# Patient Record
Sex: MALE | Race: WHITE
[De-identification: names, ages, dates, MRNs, and addresses within clinical notes are randomized per-mention and may not be internally consistent; named-entity substitution may affect disease eponyms.]

---

## 2017-12-16 ENCOUNTER — HOSPITAL ENCOUNTER (EMERGENCY)
Dept: HOSPITAL 72 - EMR | Age: 37
Discharge: HOME | End: 2017-12-16
Payer: COMMERCIAL

## 2017-12-16 VITALS — WEIGHT: 247 LBS | HEIGHT: 74 IN | BODY MASS INDEX: 31.7 KG/M2

## 2017-12-16 VITALS — SYSTOLIC BLOOD PRESSURE: 116 MMHG | DIASTOLIC BLOOD PRESSURE: 69 MMHG

## 2017-12-16 DIAGNOSIS — S05.01XA: Primary | ICD-10-CM
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
## 2017-12-16 DIAGNOSIS — I10: ICD-10-CM
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
## 2017-12-16 PROCEDURE — 99284 EMERGENCY DEPT VISIT MOD MDM: CPT

## 2017-12-16 NOTE — EMERGENCY ROOM REPORT
History of Present Illness


General


Chief Complaint:  Eye Problems


Source:  Patient





Present Illness


HPI


Is a 37-year-old male with no significant past medical history.  He presents 

with right eye pain.  Woke up with it.  [place] something in his eyes been rubbing 

it.  Now is getting worse.  No fever or chills.  Carrying.  Worse with light.  

Pain is 10 out of 10.  No other trauma.  Does not wear glasses.


Allergies:  


Coded Allergies:  


     No Known Allergies (Unverified , 12/16/17)





Patient History


Past Medical History:  see triage record, old chart reviewed


Past Surgical History:  other


Pertinent Family History:  none


Social History:  Denies: smoking


Immunizations:  other


Reviewed Nursing Documentation:  PMH: Agreed, PSxH: Agreed





Nursing Documentation-PMH


Hx Hypertension:  Yes





Review of Systems


Eye:  Reports: eye pain, Denies: blurred vision


ENT:  Denies: ear pain, nose congestion, throat swelling


Respiratory:  Denies: cough, shortness of breath


Cardiovascular:  Denies: chest pain, palpitations


Gastrointestinal:  Denies: abdominal pain, diarrhea, nausea, vomiting


Musculoskeletal:  Denies: back pain, joint pain


Skin:  Denies: rash


Neurological:  Denies: headache, numbness


Endocrine:  Denies: increased thirst, increased urine


Hematologic/Lymphatic:  Denies: easy bruising


All Other Systems:  negative except mentioned in HPI





Physical Exam





Vital Signs








  Date Time  Temp Pulse Resp B/P (MAP) Pulse Ox O2 Delivery O2 Flow Rate FiO2


 


12/16/17 02:20 97.9 108 16 116/69 95 Room Air  





vitals normal


Sp02 EP Interpretation:  reviewed, normal


General Appearance:  well appearing, no apparent distress, alert


Head:  normocephalic, atraumatic


Eyes:  left eye other - Right conjunctiva is injected.  There is a small 

corneal abrasion at 9:00 position.  Negative [place] sign., bilateral eye PERRL, 

bilateral eye EOMI


ENT:  hearing grossly normal, normal pharynx


Neck:  full range of motion, supple, no meningismus


Respiratory:  chest non-tender, lungs clear, normal breath sounds


Cardiovascular #1:  regular rate, rhythm, no murmur


Gastrointestinal:  normal bowel sounds, non tender, no mass, no organomegaly, 

no bruit, non-distended


Musculoskeletal:  back normal, gait/station normal, normal range of motion


Psychiatric:  mood/affect normal


Skin:  warm/dry





Medical Decision Making


Diagnostic Impression:  


 Primary Impression:  


 Corneal abrasion, right


 Qualified Codes:  S05.01XA - Injury of conjunctiva and corneal abrasion 

without foreign body, right eye, initial encounter


ER Course


Patient with a corneal abrasion.  No foreign body seen.  I everted the eyelids.

  No globe rupture.  We'll discharge home





Last Vital Signs








  Date Time  Temp Pulse Resp B/P (MAP) Pulse Ox O2 Delivery O2 Flow Rate FiO2


 


12/16/17 02:30 97.9  16 116/69 95 Room Air  


 


12/16/17 02:20  108      








Status:  improved


Disposition:  HOME, SELF-CARE


Condition:  Stable


Scripts


Ibuprofen* (MOTRIN*) 600 Mg Tablet


600 MG ORAL THREE TIMES A DAY, #30 TAB 0 Refills


   Prov: NALLELY BARRIOS M.D.         12/16/17 


Polymyxin/Trimethoprim (Polytrim Eye Drops) 10 Ml Drops


2 DROP OPHTHALM THREE TIMES A DAY, #1 EA


   Instill in affected eye for 7 days


   Prov: NALLELY BARRIOS M.D.         12/16/17





Additional Instructions:  


followup with your DrKacey in 2-3 days if not better.  You may need a referral to 

see an eye doctor if not better.  Return if worse.











NALLELY BARRIOS M.D. Dec 16, 2017 02:57

## 2018-10-17 ENCOUNTER — HOSPITAL ENCOUNTER (EMERGENCY)
Dept: HOSPITAL 72 - EMR | Age: 38
Discharge: LEFT BEFORE DISCHARGE | End: 2018-10-17
Payer: COMMERCIAL

## 2018-10-17 VITALS — HEIGHT: 75 IN | WEIGHT: 270 LBS | BODY MASS INDEX: 33.57 KG/M2

## 2018-10-17 VITALS — DIASTOLIC BLOOD PRESSURE: 40 MMHG | SYSTOLIC BLOOD PRESSURE: 90 MMHG

## 2018-10-17 VITALS — SYSTOLIC BLOOD PRESSURE: 112 MMHG | DIASTOLIC BLOOD PRESSURE: 78 MMHG

## 2018-10-17 DIAGNOSIS — I10: ICD-10-CM

## 2018-10-17 DIAGNOSIS — R07.9: Primary | ICD-10-CM

## 2018-10-17 LAB
ADD MANUAL DIFF: NO
ALBUMIN SERPL-MCNC: 3.6 G/DL (ref 3.4–5)
ALBUMIN/GLOB SERPL: 0.9 {RATIO} (ref 1–2.7)
ALP SERPL-CCNC: 69 U/L (ref 46–116)
ALT SERPL-CCNC: 144 U/L (ref 12–78)
ANION GAP SERPL CALC-SCNC: 13 MMOL/L (ref 5–15)
APPEARANCE UR: CLEAR
APTT BLD: 25 SEC (ref 23–33)
APTT PPP: (no result) S
AST SERPL-CCNC: 71 U/L (ref 15–37)
BASOPHILS NFR BLD AUTO: 1 % (ref 0–2)
BILIRUB SERPL-MCNC: 0.5 MG/DL (ref 0.2–1)
BUN SERPL-MCNC: 14 MG/DL (ref 7–18)
CALCIUM SERPL-MCNC: 10.2 MG/DL (ref 8.5–10.1)
CHLORIDE SERPL-SCNC: 98 MMOL/L (ref 98–107)
CK MB SERPL-MCNC: 3.9 NG/ML (ref 0–3.6)
CK SERPL-CCNC: 1624 U/L (ref 26–308)
CO2 SERPL-SCNC: 25 MMOL/L (ref 21–32)
CREAT SERPL-MCNC: 1.2 MG/DL (ref 0.55–1.3)
EOSINOPHIL NFR BLD AUTO: 1.5 % (ref 0–3)
ERYTHROCYTE [DISTWIDTH] IN BLOOD BY AUTOMATED COUNT: 12.4 % (ref 11.6–14.8)
GLOBULIN SER-MCNC: 3.8 G/DL
GLUCOSE UR STRIP-MCNC: NEGATIVE MG/DL
HCT VFR BLD CALC: 52.3 % (ref 42–52)
HGB BLD-MCNC: 18.5 G/DL (ref 14.2–18)
INR PPP: 1 (ref 0.9–1.1)
KETONES UR QL STRIP: NEGATIVE
LEUKOCYTE ESTERASE UR QL STRIP: NEGATIVE
LYMPHOCYTES NFR BLD AUTO: 36.3 % (ref 20–45)
MCV RBC AUTO: 100 FL (ref 80–99)
MONOCYTES NFR BLD AUTO: 7.5 % (ref 1–10)
NEUTROPHILS NFR BLD AUTO: 53.7 % (ref 45–75)
NITRITE UR QL STRIP: NEGATIVE
PH UR STRIP: 7 [PH] (ref 4.5–8)
PLATELET # BLD: 197 K/UL (ref 150–450)
POTASSIUM SERPL-SCNC: 3.4 MMOL/L (ref 3.5–5.1)
PROT UR QL STRIP: (no result)
RBC # BLD AUTO: 5.25 M/UL (ref 4.7–6.1)
SODIUM SERPL-SCNC: 136 MMOL/L (ref 136–145)
SP GR UR STRIP: 1 (ref 1–1.03)
UROBILINOGEN UR-MCNC: NORMAL MG/DL (ref 0–1)
WBC # BLD AUTO: 11.5 K/UL (ref 4.8–10.8)

## 2018-10-17 PROCEDURE — 82550 ASSAY OF CK (CPK): CPT

## 2018-10-17 PROCEDURE — 96361 HYDRATE IV INFUSION ADD-ON: CPT

## 2018-10-17 PROCEDURE — 71275 CT ANGIOGRAPHY CHEST: CPT

## 2018-10-17 PROCEDURE — 85730 THROMBOPLASTIN TIME PARTIAL: CPT

## 2018-10-17 PROCEDURE — 81003 URINALYSIS AUTO W/O SCOPE: CPT

## 2018-10-17 PROCEDURE — 74175 CTA ABDOMEN W/CONTRAST: CPT

## 2018-10-17 PROCEDURE — 82553 CREATINE MB FRACTION: CPT

## 2018-10-17 PROCEDURE — 80053 COMPREHEN METABOLIC PANEL: CPT

## 2018-10-17 PROCEDURE — 84484 ASSAY OF TROPONIN QUANT: CPT

## 2018-10-17 PROCEDURE — 93005 ELECTROCARDIOGRAM TRACING: CPT

## 2018-10-17 PROCEDURE — 74174 CTA ABD&PLVS W/CONTRAST: CPT

## 2018-10-17 PROCEDURE — 36415 COLL VENOUS BLD VENIPUNCTURE: CPT

## 2018-10-17 PROCEDURE — 71045 X-RAY EXAM CHEST 1 VIEW: CPT

## 2018-10-17 PROCEDURE — 99284 EMERGENCY DEPT VISIT MOD MDM: CPT

## 2018-10-17 PROCEDURE — 85025 COMPLETE CBC W/AUTO DIFF WBC: CPT

## 2018-10-17 PROCEDURE — 80307 DRUG TEST PRSMV CHEM ANLYZR: CPT

## 2018-10-17 PROCEDURE — 96374 THER/PROPH/DIAG INJ IV PUSH: CPT

## 2018-10-17 PROCEDURE — 85610 PROTHROMBIN TIME: CPT

## 2018-10-18 NOTE — CARDIOLOGY REPORT
--------------- APPROVED REPORT --------------





EKG Measurement

Heart [id number]

MA [id number]

[id number]

[id number]

[id number]





Sinus tachycardia

Nonspecific T wave abnormality

Abnormal ECG

## 2018-10-18 NOTE — DIAGNOSTIC IMAGING REPORT
Indication: Chest pain. Abdominal pain

 

Technique: Continuous helical transaxial imaging of the chest abdomen and pelvis

obtained from the thoracic inlet to the pubic symphysis during rapid intravenous

contrast administration. Arterial phase of enhancement obtained. Coronal 2-D

reformats were also obtained and maximum intensity projection images in multiple

planes. Study obtained in a Siemens sensation 64 slice CT. Automatic Exposure Control

was utilized.

 

 

Total Dose length Product (DLP):  1518.59 mGycm

 

CT Dose Index Volume (CTDIvol):   20.02 mGy

 

Comparison: None

 

Findings: The pulmonary artery is well opacified and shows no filling defects. There

is no adenopathy, pleural or pericardial effusions are identified. There is no aortic

dissection or aneurysm identified within the chest. 

 

The abdominal aorta appears normal caliber. There is no dissection or aneurysm. Major

branches including the celiac artery, SMA, and bilateral renal arteries appear widely

patent. JOHN is widely patent. The common, external, internal iliac arteries are

unremarkable.

 

There is some basilar reticular densities likely atelectasis. There is diffuse

moderate severe low-attenuation of the liver consistent with fatty infiltration. The

liver also appears prominent in size measuring 25 cm in the craniocaudal dimension.

The gallbladder is contracted. Pancreas is unremarkable. Kidneys demonstrate during

arterial phase which is not optimal. Having said that, no obvious renal abnormalities

identified. There is a 1.2 cm cyst in the left kidney demonstrated. There is no

evidence of a renal stone. There is no hydronephrosis. Urinary bladder is

unremarkable. No free fluid or free air identified. Appendix is normal. No evidence

of bowel obstruction. Small hiatal hernia noted. Small umbilical hernia containing

fat demonstrated.

 

IMPRESSION:

 

No evidence of pulmonary embolus, aortic dissection or aneurysm.

 

Hepatomegaly with the moderate to severe fatty infiltration.

 

Left renal cyst

 

Normal appendix

 

Small umbilical hernia containing fat.

 

[facility] has communicated the preliminary results to the Emergency

Department.  Their findings are largely concordant with this report.

 

The CT scanner at [facility] is accredited by the [employer] College [employer]

[employer]iology and the scans are performed using dose optimization techniques as

appropriate to a performed exam including Automatic Exposure control.

## 2023-08-31 NOTE — EMERGENCY ROOM REPORT
History of Present Illness


General


Chief Complaint:  Chest Pain


Source:  Patient





Present Illness


HPI


This patient states that he is resting comfortably when he suddenly developed 

pain in his left chest.  He states it is sudden in onset and severe in nature.  

He states his pain is worse with deep breathing.  He denies recent illness.  He 

denies cough or congestion.  Denies fever or chills.  He denies nausea or 

vomiting.  He denies abdominal pain.  He does not have a history of chest pain.

  He does have a history of controlled hypertension and controlled HIV.  He has 

no other complaints.


Allergies:  


Coded Allergies:  


     No Known Allergies (Unverified , 12/16/17)





Patient History


Past Medical History:  see triage record, HTN, HIV


Social History:  Denies: smoking, alcohol use, drug use


Reviewed Nursing Documentation:  PMH: Agreed; PSxH: Agreed





Nursing Documentation-PMH


Hx Hypertension:  Yes





Review of Systems


All Other Systems:  negative except mentioned in HPI





Physical Exam





Vital Signs








  Date Time  Temp Pulse Resp B/P (MAP) Pulse Ox O2 Delivery O2 Flow Rate FiO2


 


10/17/18 18:44 98.0 114 25 149/95 96 Room Air  





 98.1       


 


10/17/18 19:23       15.0 








Sp02 EP Interpretation:  reviewed, normal


General Appearance:  no apparent distress, alert, GCS 15, non-toxic, other - 

Anxious, hyperventilating


Head:  normocephalic, atraumatic


Eyes:  bilateral eye normal inspection, bilateral eye PERRL


ENT:  hearing grossly normal, normal pharynx, no angioedema, normal voice


Neck:  normal inspection, full range of motion


Respiratory:  lungs clear, normal breath sounds, no respiratory distress, no 

retraction, no accessory muscle use, speaking full sentences, other - Rapid 

shallow breathing, TTP over the L. chest wall.


Cardiovascular #1:  no edema, tachycardia


Gastrointestinal:  normal bowel sounds, non tender, soft, non-distended, no 

guarding, no rebound


Rectal:  deferred


Musculoskeletal:  back normal, gait/station normal, normal range of motion, non-

tender


Neurologic:  alert, oriented x3, responsive, motor strength/tone normal, 

sensory intact, speech normal


Psychiatric:  judgement/insight normal, memory normal, mood/affect normal, no 

suicidal/homicidal ideation


Skin:  normal color, no rash, warm/dry, well hydrated





Medical Decision Making


Diagnostic Impression:  


 Primary Impression:  


 Chest pain


ER Course


This patient presented with chest pain.  I initially treated this patient as 

cardiac chest pain.  He was given aspirin, nitroglycerin and morphine.  He did 

have mild low blood pressure and was given IV fluids which resolved the low 

blood pressure.  The Nitropaste is also wiped off the chest wall.  He remained 

tachycardic throughout his ED course.  He did have improvement in his symptoms.

  I was concerned for PE given the sudden onset and pleuritic nature of the 

patient's pain.  Therefore, I obtained a CTA of his chest which showed no 

evidence of PE.  Also there was no evidence of aortic dissection.  Overall, the 

patient's evaluation was reassuring.  EKG showed nonspecific ST segment 

findings.  Initial troponin was negative.  Given the rapid onset and short 

timeframe regarding this patient's chest pain, I had planned on admitting this 

patient to rule out acute coronary syndrome.  However, the patient left AGAINST 

MEDICAL ADVICE.  He was educated that I could not rule out unstable angina.  He 

indicated understanding.  I did educate the patient to return for any worsening 

or new symptoms.  At this time the patient is comfortable and would prefer to 

go home.





Laboratory Tests








Test


  10/17/18


19:00 10/17/18


20:25


 


White Blood Count


  11.5 K/UL


(4.8-10.8)  H 


 


 


Red Blood Count


  5.25 M/UL


(4.70-6.10) 


 


 


Hemoglobin


  18.5 G/DL


(14.2-18.0)  *H 


 


 


Hematocrit


  52.3 %


(42.0-52.0)  H 


 


 


Mean Corpuscular Volume


  100 FL (80-99)


H 


 


 


Mean Corpuscular Hemoglobin


  35.3 PG


(27.0-31.0)  H 


 


 


Mean Corpuscular Hemoglobin


Concent 35.4 G/DL


(32.0-36.0) 


 


 


Red Cell Distribution Width


  12.4 %


(11.6-14.8) 


 


 


Platelet Count


  197 K/UL


(150-450) 


 


 


Mean Platelet Volume


  6.5 FL


(6.5-10.1) 


 


 


Neutrophils (%) (Auto)


  53.7 %


(45.0-75.0) 


 


 


Lymphocytes (%) (Auto)


  36.3 %


(20.0-45.0) 


 


 


Monocytes (%) (Auto)


  7.5 %


(1.0-10.0) 


 


 


Eosinophils (%) (Auto)


  1.5 %


(0.0-3.0) 


 


 


Basophils (%) (Auto)


  1.0 %


(0.0-2.0) 


 


 


Prothrombin Time


  10.6 SEC


(9.30-11.50) 


 


 


Prothrombin Time INR 1.0 (0.9-1.1)   


 


PTT


  25 SEC (23-33)


  


 


 


Sodium Level


  136 MMOL/L


(136-145) 


 


 


Potassium Level


  3.4 MMOL/L


(3.5-5.1)  L 


 


 


Chloride Level


  98 MMOL/L


() 


 


 


Carbon Dioxide Level


  25 MMOL/L


(21-32) 


 


 


Anion Gap


  13 mmol/L


(5-15) 


 


 


Blood Urea Nitrogen


  14 mg/dL


(7-18) 


 


 


Creatinine


  1.2 MG/DL


(0.55-1.30) 


 


 


Estimate Glomerular


Filtration Rate > 60 mL/min


(>60) 


 


 


Glucose Level


  125 MG/DL


()  H 


 


 


Calcium Level


  10.2 MG/DL


(8.5-10.1)  H 


 


 


Total Bilirubin


  0.5 MG/DL


(0.2-1.0) 


 


 


Aspartate Amino Transferase


(AST) 71 U/L (15-37)


H 


 


 


Alanine Aminotransferase (ALT)


  144 U/L


(12-78)  H 


 


 


Alkaline Phosphatase


  69 U/L


() 


 


 


Total Creatine Kinase


  1624 U/L


()  H 


 


 


Creatine Kinase MB


  3.9 NG/ML


(0.0-3.6)  H 


 


 


Creatine Kinase MB Relative


Index 0.2  


  


 


 


Troponin I


  0.010 ng/mL


(0.000-0.056) 


 


 


Total Protein


  7.4 G/DL


(6.4-8.2) 


 


 


Albumin


  3.6 G/DL


(3.4-5.0) 


 


 


Globulin 3.8 g/dL   


 


Albumin/Globulin Ratio


  0.9 (1.0-2.7)


L 


 


 


Urine Color  Pale yellow  


 


Urine Appearance  Clear  


 


Urine pH  7 (4.5-8.0)  


 


Urine Specific Gravity


  


  1.005


(1.005-1.035)


 


Urine Protein


  


  1+ (NEGATIVE)


H


 


Urine Glucose (UA)


  


  Negative


(NEGATIVE)


 


Urine Ketones


  


  Negative


(NEGATIVE)


 


Urine Blood


  


  Negative


(NEGATIVE)


 


Urine Nitrite


  


  Negative


(NEGATIVE)


 


Urine Bilirubin


  


  Negative


(NEGATIVE)


 


Urine Urobilinogen


  


  Normal MG/DL


(0.0-1.0)


 


Urine Leukocyte Esterase


  


  Negative


(NEGATIVE)


 


Urine RBC


  


  0-2 /HPF (0 -


0)  H


 


Urine WBC


  


  0-2 /HPF (0 -


0)


 


Urine Squamous Epithelial


Cells 


  None /LPF


(NONE/OCC)


 


Urine Bacteria


  


  Few /HPF


(NONE)


 


Urine Opiates Screen


  


  Negative


(NEGATIVE)


 


Urine Barbiturates Screen


  


  Negative


(NEGATIVE)


 


Phencyclidine (PCP) Screen


  


  Negative


(NEGATIVE)


 


Urine Amphetamines Screen


  


  Negative


(NEGATIVE)


 


Urine Benzodiazepines Screen


  


  Negative


(NEGATIVE)


 


Urine Cocaine Screen


  


  Negative


(NEGATIVE)


 


Urine Marijuana (THC) Screen


  


  Negative


(NEGATIVE)








EKG Diagnostic Results


Rate:  normal


Rhythm:  NSR


ST Segments:  other - NSST changes.





Rhythm Strip Diag. Results


EP Interpretation:  yes


Rate:  110's


Rhythm:  NSR, no PVC's, no ectopy





Chest X-Ray Diagnostic Results


Chest X-Ray Diagnostic Results :  


   Chest X-Ray Ordered:  Yes


   # of Views/Limited/Complete:  1 View


   Indication:  Chest Pain


   Interpretation:  no consolidation, no effusion, no pneumothorax, no acute 

cardiopulmonary disease


   Impression:  No acute disease


   Electronically Signed by:  Steve





Last Vital Signs








  Date Time  Temp Pulse Resp B/P (MAP) Pulse Ox O2 Delivery O2 Flow Rate FiO2


 


10/17/18 19:23  102 25 90/40 95 Simple Mask 15.0 


 


10/17/18 19:12 98.0       








Status:  improved


Disposition:  AGAINST MEDICAL ADVICE


Condition:  Stable


Referrals:  


NOT CHOSEN IPA/MD,REFERRING (PCP)


Patient Instructions:  Nonspecific Chest Pain











Nasrin Mosher DO Oct 17, 2018 22:19
[place].../Clavicles.../Shoulders.../Scapula.../Dorsal hand...